# Patient Record
(demographics unavailable — no encounter records)

---

## 2025-06-10 NOTE — REVIEW OF SYSTEMS
[Recurrent Throat Infections] : recurrent throat infections [Recurrent Ear Infections] : recurrent ear infections [Nl] : Genitourinary [Immunizations are up to date] : Immunizations are up to date

## 2025-06-10 NOTE — IMPRESSION
[_____] : weeds ([unfilled]) [________] : [unfilled] [Allergy Testing Dust Mite] : dust mites [Allergy Testing Mixed Feathers] : feathers [Allergy Testing Dog] : dog [Allergy Testing Cockroach] : cockroach [Allergy Testing Cat] : cat [Allergy Testing Grasses] : grasses [] : molds

## 2025-06-10 NOTE — IMPRESSION
[_____] : weeds ([unfilled]) [________] : [unfilled] [Allergy Testing Mixed Feathers] : feathers [Allergy Testing Dust Mite] : dust mites [Allergy Testing Dog] : dog [Allergy Testing Cockroach] : cockroach [] : molds [Allergy Testing Cat] : cat [Allergy Testing Grasses] : grasses

## 2025-06-10 NOTE — IMPRESSION
[_____] : weeds ([unfilled]) [________] : [unfilled] [Allergy Testing Dust Mite] : dust mites [Allergy Testing Mixed Feathers] : feathers [Allergy Testing Dog] : dog [Allergy Testing Cockroach] : cockroach [Allergy Testing Grasses] : grasses [] : molds [Allergy Testing Cat] : cat

## 2025-06-11 NOTE — CONSULT LETTER
[Dear  ___] : Dear  [unfilled], [Consult Letter:] : I had the pleasure of evaluating your patient, [unfilled]. [Please see my note below.] : Please see my note below. [Consult Closing:] : Thank you very much for allowing me to participate in the care of this patient.  If you have any questions, please do not hesitate to contact me. [Sincerely,] : Sincerely, [FreeTextEntry3] : Latisha York MD Attending Physician, Allergy and Immunology , [facility] Department of Medicine and Pediatrics [facility]/Division of Allergy and Immunology   [DrJohan  ___] : Dr. GUAMAN

## 2025-06-11 NOTE — REASON FOR VISIT
[Initial Consultation] : an initial consultation for [Mother] : mother [FreeTextEntry2] : referred by ENT due to recurrent ear infections, allergic rhinoconjunctivitis

## 2025-06-11 NOTE — HISTORY OF PRESENT ILLNESS
[de-identified] : Frankie is a 4-year-old male here with mom for an initial visit referred by ENT for recurrent ear infections and strep throat and allergic rhinoconjunctivitis  He has had multiple infections since March: 3/25- + strep 4/16- + strep, perforated ear drum 5/4- ear infection, + strep, fever- given Augmentin for 10 days 5/13- saw ENT: tonsils were still red and swollen- started on cefdinir for 10 days 6/5- saw ENT: left ear infection with pus and wants to start him on Augmentin extra strength          nasal endoscopy: 98% occlusion due to adenoids, using Flonase for adenoids         + snoring, + mouth breathing, denies nocturnal symptoms, denies gasping during sleep         Since he is asymptomatic mom did not start him on antibiotics Never had a sleep study ENT recommends adenoidectomy and ear tubes  ENVIRONMENTAL ALLERGIES Had one incidence of puffy eyes after playing outside, mom gave Claritin (last took a few weekends ago), swelling persisted for 3 days  Tolerates milk, eggs, fish, peanuts, tree nuts, soy, sesame, wheat has not tried shellfish + mice in garage

## 2025-06-11 NOTE — PHYSICAL EXAM
[Alert] : alert [Well Nourished] : well nourished [Healthy Appearance] : healthy appearance [No Acute Distress] : no acute distress [Well Developed] : well developed [No Discharge] : no discharge [Sclera Not Icteric] : sclera not icteric [Normal Nasal Mucosa] : the nasal mucosa was normal [Normal Lips/Tongue] : the lips and tongue were normal [Normal Outer Ear/Nose] : the ears and nose were normal in appearance [No Thrush] : no thrush [Normal Rate and Effort] : normal respiratory rhythm and effort [No Crackles] : no crackles [No Retractions] : no retractions [Bilateral Audible Breath Sounds] : bilateral audible breath sounds [Normal Rate] : heart rate was normal  [Regular Rhythm] : with a regular rhythm [Skin Intact] : skin intact  [No Rash] : no rash [No Skin Lesions] : no skin lesions [No clubbing] : no clubbing [No Edema] : no edema [No Cyanosis] : no cyanosis [Normal Mood] : mood was normal [Normal Affect] : affect was normal [Alert, Awake, Oriented as Age-Appropriate] : alert, awake, oriented as age appropriate [Boggy Nasal Turbinates] : boggy and/or pale nasal turbinates [Pale mucosa] : pale mucosa [Posterior Pharyngeal Cobblestoning] : posterior pharyngeal cobblestoning [Clear Rhinorrhea] : clear rhinorrhea was seen [de-identified] : enlarged tonsils, erythema left TM but light reflex visible; no purulence appreciated

## 2025-06-11 NOTE — CONSULT LETTER
[Dear  ___] : Dear  [unfilled], [Consult Letter:] : I had the pleasure of evaluating your patient, [unfilled]. [Please see my note below.] : Please see my note below. [Consult Closing:] : Thank you very much for allowing me to participate in the care of this patient.  If you have any questions, please do not hesitate to contact me. [Sincerely,] : Sincerely, [FreeTextEntry3] : Latisha York MD Attending Physician, Allergy and Immunology , [facility] Department of Medicine and Pediatrics [facility]/Division of Allergy and Immunology   [DrJohan  ___] : Dr. GUAMAN

## 2025-06-11 NOTE — HISTORY OF PRESENT ILLNESS
[de-identified] : Frankie is a 4-year-old male here with mom for an initial visit referred by ENT for recurrent ear infections and strep throat and allergic rhinoconjunctivitis  He has had multiple infections since March: 3/25- + strep 4/16- + strep, perforated ear drum 5/4- ear infection, + strep, fever- given Augmentin for 10 days 5/13- saw ENT: tonsils were still red and swollen- started on cefdinir for 10 days 6/5- saw ENT: left ear infection with pus and wants to start him on Augmentin extra strength          nasal endoscopy: 98% occlusion due to adenoids, using Flonase for adenoids         + snoring, + mouth breathing, denies nocturnal symptoms, denies gasping during sleep         Since he is asymptomatic mom did not start him on antibiotics Never had a sleep study ENT recommends adenoidectomy and ear tubes  ENVIRONMENTAL ALLERGIES Had one incidence of puffy eyes after playing outside, mom gave Claritin (last took a few weekends ago), swelling persisted for 3 days  Tolerates milk, eggs, fish, peanuts, tree nuts, soy, sesame, wheat has not tried shellfish + mice in garage

## 2025-06-11 NOTE — PHYSICAL EXAM
[Alert] : alert [Well Nourished] : well nourished [Healthy Appearance] : healthy appearance [No Acute Distress] : no acute distress [Well Developed] : well developed [No Discharge] : no discharge [Sclera Not Icteric] : sclera not icteric [Normal Nasal Mucosa] : the nasal mucosa was normal [Normal Lips/Tongue] : the lips and tongue were normal [Normal Outer Ear/Nose] : the ears and nose were normal in appearance [No Thrush] : no thrush [Normal Rate and Effort] : normal respiratory rhythm and effort [No Crackles] : no crackles [No Retractions] : no retractions [Bilateral Audible Breath Sounds] : bilateral audible breath sounds [Normal Rate] : heart rate was normal  [Regular Rhythm] : with a regular rhythm [Skin Intact] : skin intact  [No Rash] : no rash [No Skin Lesions] : no skin lesions [No Edema] : no edema [No clubbing] : no clubbing [No Cyanosis] : no cyanosis [Normal Mood] : mood was normal [Normal Affect] : affect was normal [Alert, Awake, Oriented as Age-Appropriate] : alert, awake, oriented as age appropriate [Boggy Nasal Turbinates] : boggy and/or pale nasal turbinates [Pale mucosa] : pale mucosa [Posterior Pharyngeal Cobblestoning] : posterior pharyngeal cobblestoning [Clear Rhinorrhea] : clear rhinorrhea was seen [de-identified] : enlarged tonsils, erythema left TM but light reflex visible; no purulence appreciated

## 2025-06-11 NOTE — SOCIAL HISTORY
[House] : [unfilled] lives in a house  [Central] : air conditioning provided by central unit [Central Forced Air] : heating provided by central forced air [None] : none [Dust Mite Covers] : does not have dust mite covers [Feather Pillows] : does not have feather pillows [Feather Comforter] : does not have a feather comforter [Bedroom] : not in the bedroom [Living Area] : not in the living area [Smokers in Household] : there are no smokers in the home

## 2025-06-11 NOTE — SOCIAL HISTORY
[House] : [unfilled] lives in a house  [Central] : air conditioning provided by central unit [Central Forced Air] : heating provided by central forced air [None] : none [Feather Pillows] : does not have feather pillows [Dust Mite Covers] : does not have dust mite covers [Feather Comforter] : does not have a feather comforter [Bedroom] : not in the bedroom [Smokers in Household] : there are no smokers in the home [Living Area] : not in the living area

## 2025-06-11 NOTE — END OF VISIT
[FreeTextEntry3] :   I, Dr. York, personally performed the evaluation and management (E/M) services for this new patient. That E/M includes conducting the clinically appropriate initial history &/or exam, assessing all conditions, and establishing the plan of care. Today, my KELLY, Randa Glez was here to observe my evaluation and management service for this patient & follow plan of care established by me going forward.

## 2025-06-11 NOTE — CONSULT LETTER
[Dear  ___] : Dear  [unfilled], [Consult Letter:] : I had the pleasure of evaluating your patient, [unfilled]. [Please see my note below.] : Please see my note below. [Consult Closing:] : Thank you very much for allowing me to participate in the care of this patient.  If you have any questions, please do not hesitate to contact me. [Sincerely,] : Sincerely, [FreeTextEntry3] : Latisha York MD Attending Physician, Allergy and Immunology , [facility] Department of Medicine and Pediatrics [facility]/Division of Allergy and Immunology   [DrJohan  ___] : Dr. GUAMAN

## 2025-06-11 NOTE — PHYSICAL EXAM
[Alert] : alert [Well Nourished] : well nourished [Healthy Appearance] : healthy appearance [No Acute Distress] : no acute distress [Well Developed] : well developed [No Discharge] : no discharge [Sclera Not Icteric] : sclera not icteric [Normal Nasal Mucosa] : the nasal mucosa was normal [Normal Lips/Tongue] : the lips and tongue were normal [Normal Outer Ear/Nose] : the ears and nose were normal in appearance [No Thrush] : no thrush [Normal Rate and Effort] : normal respiratory rhythm and effort [No Crackles] : no crackles [No Retractions] : no retractions [Bilateral Audible Breath Sounds] : bilateral audible breath sounds [Normal Rate] : heart rate was normal  [Regular Rhythm] : with a regular rhythm [Skin Intact] : skin intact  [No Rash] : no rash [No Skin Lesions] : no skin lesions [No clubbing] : no clubbing [No Edema] : no edema [No Cyanosis] : no cyanosis [Normal Mood] : mood was normal [Normal Affect] : affect was normal [Alert, Awake, Oriented as Age-Appropriate] : alert, awake, oriented as age appropriate [Pale mucosa] : pale mucosa [Boggy Nasal Turbinates] : boggy and/or pale nasal turbinates [Posterior Pharyngeal Cobblestoning] : posterior pharyngeal cobblestoning [Clear Rhinorrhea] : clear rhinorrhea was seen [de-identified] : enlarged tonsils, erythema left TM but light reflex visible; no purulence appreciated

## 2025-06-11 NOTE — HISTORY OF PRESENT ILLNESS
[de-identified] : Frankie is a 4-year-old male here with mom for an initial visit referred by ENT for recurrent ear infections and strep throat and allergic rhinoconjunctivitis  He has had multiple infections since March: 3/25- + strep 4/16- + strep, perforated ear drum 5/4- ear infection, + strep, fever- given Augmentin for 10 days 5/13- saw ENT: tonsils were still red and swollen- started on cefdinir for 10 days 6/5- saw ENT: left ear infection with pus and wants to start him on Augmentin extra strength          nasal endoscopy: 98% occlusion due to adenoids, using Flonase for adenoids         + snoring, + mouth breathing, denies nocturnal symptoms, denies gasping during sleep         Since he is asymptomatic mom did not start him on antibiotics Never had a sleep study ENT recommends adenoidectomy and ear tubes  ENVIRONMENTAL ALLERGIES Had one incidence of puffy eyes after playing outside, mom gave Claritin (last took a few weekends ago), swelling persisted for 3 days  Tolerates milk, eggs, fish, peanuts, tree nuts, soy, sesame, wheat has not tried shellfish + mice in garage